# Patient Record
Sex: MALE | NOT HISPANIC OR LATINO | URBAN - METROPOLITAN AREA
[De-identification: names, ages, dates, MRNs, and addresses within clinical notes are randomized per-mention and may not be internally consistent; named-entity substitution may affect disease eponyms.]

---
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
## 2025-06-19 ENCOUNTER — OFFICE VISIT (OUTPATIENT)
Dept: INTERNAL MEDICINE | Facility: CLINIC | Age: 20
End: 2025-06-19
Payer: COMMERCIAL

## 2025-06-19 VITALS
TEMPERATURE: 97.6 F | SYSTOLIC BLOOD PRESSURE: 120 MMHG | DIASTOLIC BLOOD PRESSURE: 80 MMHG | OXYGEN SATURATION: 99 % | BODY MASS INDEX: 27.57 KG/M2 | HEART RATE: 61 BPM | WEIGHT: 181.9 LBS | HEIGHT: 68 IN

## 2025-06-19 DIAGNOSIS — Z23 NEED FOR DIPHTHERIA-TETANUS-PERTUSSIS (TDAP) VACCINE: ICD-10-CM

## 2025-06-19 DIAGNOSIS — Z00.01 ENCOUNTER FOR GENERAL ADULT MEDICAL EXAMINATION WITH ABNORMAL FINDINGS: Primary | ICD-10-CM

## 2025-06-19 DIAGNOSIS — R41.840 ATTENTION DEFICIT: ICD-10-CM

## 2025-06-19 DIAGNOSIS — Z79.4 LONG TERM (CURRENT) USE OF INSULIN: ICD-10-CM

## 2025-06-19 DIAGNOSIS — Z11.3 SCREENING EXAMINATION FOR STI: ICD-10-CM

## 2025-06-19 DIAGNOSIS — E10.65 TYPE 1 DIABETES MELLITUS WITH HYPERGLYCEMIA: ICD-10-CM

## 2025-06-19 RX ORDER — DEXTROAMPHETAMINE SACCHARATE, AMPHETAMINE ASPARTATE MONOHYDRATE, DEXTROAMPHETAMINE SULFATE AND AMPHETAMINE SULFATE 6.25; 6.25; 6.25; 6.25 MG/1; MG/1; MG/1; MG/1
25 CAPSULE, EXTENDED RELEASE ORAL DAILY
COMMUNITY
Start: 2025-05-27 | End: 2025-06-26

## 2025-06-19 RX ORDER — INSULIN LISPRO 100 [IU]/ML
INJECTION, SOLUTION INTRAVENOUS; SUBCUTANEOUS
COMMUNITY
Start: 2025-05-28

## 2025-06-19 RX ORDER — GLUCAGON 3 MG/1
3 POWDER NASAL
COMMUNITY
Start: 2025-02-25

## 2025-06-19 NOTE — PROGRESS NOTES
"Establish Care and Annual Exam      HPI  Ulysses Thorpe is a 19 y.o. male who establish, new patient CPE: Transfer from Peds, Dr. Munoz. Recent graduate  1. DM I - dx'd in 5th grade.  Sees endo with Dr. Gomez and sees q 3 months. Overall doing well, last A1C was 7.9%, 'it was alright'. 'It needs to be a little lower', noting eatwing was tough in college and notes needs to be in better routine.  Rare low sugar events.    Sees optho regularly, saw within the year. No retinopathy.   2. ADD - not sure if ADD or ADHD.  Dx'd in college, gibson year.  Dx'd by Dr. Munoz, not sure how exactly dx'd.  Is not currently taking med as not working right now.  Last time took last month during finals, now graduated.   3. JERRY - dx'd in elementary school. Used 'to be prescribed Prozac'. \"Anger issues when I was little'.  Did see talk therapst 'when I was little'.  Has not seen one in years. Mood now is 'good', feels like more relaxed.       Review of Systems   Constitutional: Negative for chills, fever, malaise/fatigue, weight gain and weight loss.   HENT:  Negative for congestion, hearing loss, odynophagia and sore throat.    Eyes:  Negative for discharge, double vision, pain and redness.        Saw optho in 2024     Cardiovascular:  Negative for chest pain, dyspnea on exertion, irregular heartbeat, leg swelling, near-syncope, palpitations and syncope.   Respiratory:  Negative for cough and shortness of breath.    Endocrine: Negative for polydipsia, polyphagia and polyuria.   Hematologic/Lymphatic: Negative for bleeding problem. Does not bruise/bleed easily.   Skin:  Negative for rash and suspicious lesions.   Musculoskeletal:  Negative for joint pain, joint swelling, muscle cramps, muscle weakness and myalgias.   Gastrointestinal:  Negative for bowel incontinence, constipation, diarrhea, dysphagia, heartburn, nausea and vomiting.   Genitourinary:  Negative for bladder incontinence, dysuria, frequency, hematuria, hesitancy " and incomplete emptying.   Neurological:  Negative for dizziness, headaches and light-headedness.   Psychiatric/Behavioral:  Negative for depression. The patient does not have insomnia and is not nervous/anxious.    Allergic/Immunologic: Negative for environmental allergies and persistent infections.       Problem List:    Patient Active Problem List   Diagnosis    Type 1 diabetes mellitus with hyperglycemia    Long term (current) use of insulin    Attention deficit       Medical History:    Past Medical History:   Diagnosis Date    Diabetes mellitus     Generalized anxiety disorder 02/05/2016    Dx'd in youth; on SSRI until high school age         Long term (current) use of insulin 04/15/2014    Type 1 diabetes mellitus with hyperglycemia 05/18/2020        Social History:    Social History     Socioeconomic History    Marital status: Single   Tobacco Use    Smoking status: Never    Smokeless tobacco: Never    Tobacco comments:     [employer] packets 'every once in a while' with drinking   Vaping Use    Vaping status: Never Used   Substance and Sexual Activity    Alcohol use: Yes     Comment: 'less out of college'; Weekends only in 2025, ~15-20/ weekend    Drug use: Yes     Types: Marijuana     Comment: twice a month    Sexual activity: Not Currently     Partners: Female     Birth control/protection: Condom     Comment: > 1 partner in past, all female; no hx of STI's; condoms 'most of the time'       Family History:   Family History   Problem Relation Age of Onset    Hypertension Mother     Hypertension Father     No Known Problems Sister     Diabetes type II Paternal Grandfather        Surgical History: History reviewed. No pertinent surgical history.      Current Outpatient Medications:     amphetamine-dextroamphetamine XR (ADDERALL XR) 25 MG 24 hr capsule, Take 1 capsule by mouth Daily, Disp: , Rfl:     Glucagon (Baqsimi One Pack) 3 MG/DOSE powder, Administer 3 mg into the nostril(s) as directed by provider., Disp: ,  Rfl:     HumaLOG 100 UNIT/ML injection, USE AS DIRECTED VIA INSULIN PUMP UP  UNITS DAILY., Disp: , Rfl:     Vitals:    06/19/25 1015   BP: 120/80   Pulse: 61   Temp: 97.6 °F (36.4 °C)   SpO2: 99%     Body mass index is 27.57 kg/m².    Physical Exam  Vitals reviewed.   Constitutional:       General: He is not in acute distress.     Appearance: Normal appearance. He is well-developed. He is not ill-appearing or toxic-appearing.   HENT:      Head: Normocephalic and atraumatic.      Right Ear: Hearing, tympanic membrane, ear canal and external ear normal. There is no impacted cerumen.      Left Ear: Hearing, tympanic membrane, ear canal and external ear normal. There is no impacted cerumen.      Nose: Nose normal.      Mouth/Throat:      Mouth: Mucous membranes are moist. No oral lesions.      Pharynx: Oropharynx is clear. Uvula midline. No pharyngeal swelling, oropharyngeal exudate, posterior oropharyngeal erythema or uvula swelling.   Eyes:      General: Lids are normal. No scleral icterus.        Right eye: No discharge.         Left eye: No discharge.      Extraocular Movements: Extraocular movements intact.      Conjunctiva/sclera: Conjunctivae normal.      Pupils: Pupils are equal, round, and reactive to light.   Neck:      Thyroid: No thyroid mass or thyromegaly.      Vascular: No carotid bruit.   Cardiovascular:      Rate and Rhythm: Normal rate and regular rhythm.      Pulses:           Radial pulses are 2+ on the right side and 2+ on the left side.        Dorsalis pedis pulses are 2+ on the right side and 2+ on the left side.        Posterior tibial pulses are 2+ on the right side and 2+ on the left side.      Heart sounds: Normal heart sounds, S1 normal and S2 normal. No murmur heard.     No friction rub. No gallop.   Pulmonary:      Effort: Pulmonary effort is normal. No respiratory distress.      Breath sounds: Normal breath sounds. No wheezing, rhonchi or rales.   Abdominal:      General: Bowel  sounds are normal. There is no distension.      Palpations: Abdomen is soft. There is no mass.      Tenderness: There is no abdominal tenderness. There is no guarding or rebound.      Hernia: There is no hernia in the left inguinal area or right inguinal area.   Genitourinary:     Penis: Normal. No discharge.       Testes: Normal.         Right: Mass, tenderness or swelling not present.         Left: Mass, tenderness or swelling not present.      Epididymis:      Right: Normal.      Left: Normal.   Musculoskeletal:         General: Normal range of motion.      Right shoulder: No tenderness, bony tenderness or crepitus. Normal range of motion.      Left shoulder: No tenderness, bony tenderness or crepitus. Normal range of motion.      Right hand: No tenderness or bony tenderness. Normal range of motion. Normal strength.      Left hand: No tenderness or bony tenderness. Normal range of motion. Normal strength.      Cervical back: Full passive range of motion without pain, normal range of motion and neck supple. No rigidity. No muscular tenderness.      Right lower leg: No edema.      Left lower leg: No edema.   Lymphadenopathy:      Cervical: No cervical adenopathy.      Lower Body: No right inguinal adenopathy. No left inguinal adenopathy.   Skin:     General: Skin is warm and dry.      Findings: No rash.   Neurological:      Mental Status: He is alert and oriented to person, place, and time.      Cranial Nerves: No cranial nerve deficit, dysarthria or facial asymmetry.      Sensory: No sensory deficit.      Motor: No weakness, tremor, atrophy or abnormal muscle tone.      Gait: Gait normal.      Deep Tendon Reflexes: Reflexes are normal and symmetric.      Reflex Scores:       Patellar reflexes are 2+ on the right side and 2+ on the left side.       Achilles reflexes are 2+ on the right side and 2+ on the left side.  Psychiatric:         Attention and Perception: Attention normal.         Mood and Affect: Mood  normal.         Speech: Speech normal.         Behavior: Behavior normal. Behavior is cooperative.         Thought Content: Thought content normal.           ECG 12 Lead    Date/Time: 6/19/2025 10:53 AM  Performed by: Rupert Roland MD    Authorized by: Rupert Roland MD  Comparison: not compared with previous ECG   Previous ECG: no previous ECG available  Rhythm: sinus bradycardia  Rate: bradycardic  BPM: 55  Conduction: conduction normal  ST Segments: ST segments normal  T Waves: T waves normal  QRS axis: normal    Clinical impression: normal ECG  Comments: QTc - 390  Indication - new pt CPE, baseline            Assessment/ Plan  Diagnoses and all orders for this visit:    Encounter for general adult medical examination with abnormal findings  -     Comprehensive Metabolic Panel  -     Chlamydia trachomatis, Neisseria gonorrhoeae, PCR - , Urine, Clean Catch  -     Hepatitis C Antibody  -     HIV-1 / O / 2 Ag / Antibody  -     Lipid Panel With LDL / HDL Ratio  -     TSH  -     UA / M With / Rflx Culture([facility] ONLY) - Urine, Clean Catch  -     CBC & Differential  -     Hemoglobin A1c    Type 1 diabetes mellitus with hyperglycemia  -     Comprehensive Metabolic Panel  -     Lipid Panel With LDL / HDL Ratio  -     CBC & Differential  -     Hemoglobin A1c    Long term (current) use of insulin    Attention deficit  -     Comprehensive Metabolic Panel  -     TSH  -     CBC & Differential    Screening examination for STI  -     Chlamydia trachomatis, Neisseria gonorrhoeae, PCR - , Urine, Clean Catch  -     HIV-1 / O / 2 Ag / Antibody    Need for diphtheria-tetanus-pertussis (Tdap) vaccine  -     Tdap Vaccine Greater Than or Equal To 6yo IM    Other orders  -     HumaLOG 100 UNIT/ML injection; USE AS DIRECTED VIA INSULIN PUMP UP  UNITS DAILY.  -     Glucagon (Baqsimi One Pack) 3 MG/DOSE powder; Administer 3 mg into the nostril(s) as directed by provider.  -     amphetamine-dextroamphetamine XR (ADDERALL XR) 25 MG  24 hr capsule; Take 1 capsule by mouth Daily  -     ECG 12 Lead        Return in about 1 year (around 6/19/2026) for Annual physical.      Discussion:  Ulysses Thorpe is a 19 y.o. male who establish, new patient CPE: Transfer from [facility], Dr. Munoz. Recent graduate from [employer].  1. DM I - dx'd in 5th grade.,  Sees pediatric endocrinology with Dr. Gomez ~every 3 months.  On insulin pump.  UTD ophthalmology 2024, no noted retinopathy.  Working on improving diet and regularity of diet intake.  Counseled on TLC diet modifications today and EtOH reduction   2. ADD -reviewed pediatric electronic health record today and unable to find documentation of ADD diagnosis.  Last visit with regular pediatrician in chart is 2020.  Patient unclear of his exact diagnosis and how that was obtained.  I counseled patient that I absolutely need some documentation of his diagnosis to provide medication.  Additionally, as he is transitioning to adulthood, would generally recommend formal neuropsychology testing to determine long-term attention deficit diagnosis and plan.  Patient will try to obtain records and communicate with me going forward on next steps.  3. JERRY - dx'd in ~elementary school, s/p talk therapy and fluoxetine use.  Off meds since gibson year of high school.  Denies depression or anxiety SX today.    4. HM -check labs; flu/COVID update this fall (DM 1 risk factor); Tdap - today; Hep A/B/ PCV7/ HPV - UTD; D/W patient goal of TLC diet with fruits and vegetables every day every meal and simple guidelines; counseled on barrier protection with all sexual encounters; D/W patient EtOH reduction and avoidance of binge drinking behaviors, particular as he transitions away from college; C/W exercise    RTC one year CPE, WITHOUT LABS PRIOR (will use endo labs and add on here as needed)

## 2025-06-20 ENCOUNTER — RESULTS FOLLOW-UP (OUTPATIENT)
Dept: INTERNAL MEDICINE | Facility: CLINIC | Age: 20
End: 2025-06-20
Payer: COMMERCIAL

## 2025-06-20 LAB
ALBUMIN SERPL-MCNC: 4.7 G/DL (ref 4.3–5.2)
ALP SERPL-CCNC: 61 IU/L (ref 44–121)
ALT SERPL-CCNC: 13 IU/L (ref 0–44)
AST SERPL-CCNC: 20 IU/L (ref 0–40)
BASOPHILS # BLD AUTO: 0 X10E3/UL (ref 0–0.2)
BASOPHILS NFR BLD AUTO: 1 %
BILIRUB SERPL-MCNC: 1 MG/DL (ref 0–1.2)
BUN SERPL-MCNC: 8 MG/DL (ref 6–20)
BUN/CREAT SERPL: 9 (ref 9–20)
CALCIUM SERPL-MCNC: 9.2 MG/DL (ref 8.7–10.2)
CHLORIDE SERPL-SCNC: 102 MMOL/L (ref 96–106)
CHOLEST SERPL-MCNC: 185 MG/DL (ref 100–199)
CO2 SERPL-SCNC: 22 MMOL/L (ref 20–29)
CREAT SERPL-MCNC: 0.93 MG/DL (ref 0.76–1.27)
EGFRCR SERPLBLD CKD-EPI 2021: 120 ML/MIN/1.73
EOSINOPHIL # BLD AUTO: 0 X10E3/UL (ref 0–0.4)
EOSINOPHIL NFR BLD AUTO: 1 %
ERYTHROCYTE [DISTWIDTH] IN BLOOD BY AUTOMATED COUNT: 11.9 % (ref 11.6–15.4)
GLOBULIN SER CALC-MCNC: 2.2 G/DL (ref 1.5–4.5)
GLUCOSE SERPL-MCNC: 130 MG/DL (ref 70–99)
HBA1C MFR BLD: 7.7 % (ref 4.8–5.6)
HCT VFR BLD AUTO: 47.2 % (ref 37.5–51)
HCV IGG SERPL QL IA: NON REACTIVE
HDLC SERPL-MCNC: 55 MG/DL
HGB BLD-MCNC: 16 G/DL (ref 13–17.7)
HIV 1+2 AB+HIV1 P24 AG SERPL QL IA: NON REACTIVE
IMM GRANULOCYTES # BLD AUTO: 0 X10E3/UL (ref 0–0.1)
IMM GRANULOCYTES NFR BLD AUTO: 0 %
LDLC SERPL CALC-MCNC: 114 MG/DL (ref 0–99)
LDLC/HDLC SERPL: 2.1 RATIO (ref 0–3.6)
LYMPHOCYTES # BLD AUTO: 1.3 X10E3/UL (ref 0.7–3.1)
LYMPHOCYTES NFR BLD AUTO: 23 %
MCH RBC QN AUTO: 31.9 PG (ref 26.6–33)
MCHC RBC AUTO-ENTMCNC: 33.9 G/DL (ref 31.5–35.7)
MCV RBC AUTO: 94 FL (ref 79–97)
MONOCYTES # BLD AUTO: 0.4 X10E3/UL (ref 0.1–0.9)
MONOCYTES NFR BLD AUTO: 7 %
NEUTROPHILS # BLD AUTO: 3.8 X10E3/UL (ref 1.4–7)
NEUTROPHILS NFR BLD AUTO: 68 %
PLATELET # BLD AUTO: 182 X10E3/UL (ref 150–450)
POTASSIUM SERPL-SCNC: 4.3 MMOL/L (ref 3.5–5.2)
PROT SERPL-MCNC: 6.9 G/DL (ref 6–8.5)
RBC # BLD AUTO: 5.01 X10E6/UL (ref 4.14–5.8)
SODIUM SERPL-SCNC: 140 MMOL/L (ref 134–144)
TRIGL SERPL-MCNC: 90 MG/DL (ref 0–149)
TSH SERPL DL<=0.005 MIU/L-ACNC: 2.03 UIU/ML (ref 0.45–4.5)
UNABLE TO VOID: NORMAL
VLDLC SERPL CALC-MCNC: 16 MG/DL (ref 5–40)
WBC # BLD AUTO: 5.5 X10E3/UL (ref 3.4–10.8)

## 2025-06-23 DIAGNOSIS — Z11.3 SCREENING EXAMINATION FOR STI: Primary | ICD-10-CM

## 2025-06-23 DIAGNOSIS — Z00.00 ANNUAL PHYSICAL EXAM: ICD-10-CM
